# Patient Record
(demographics unavailable — no encounter records)

---

## 2025-01-06 NOTE — PHYSICAL EXAM
[Alert] : alert [Normocephalic] : normocephalic [Clear Tympanic membranes with present light reflex and bony landmarks] : clear tympanic membranes with present light reflex and bony landmarks [No Discharge] : no discharge [Palate Intact] : palate intact [No Caries] : no caries [Supple, full passive range of motion] : supple, full passive range of motion [Clear to Auscultation Bilaterally] : clear to auscultation bilaterally [Regular Rate and Rhythm] : regular rate and rhythm [Normal S1, S2 present] : normal S1, S2 present [No Murmurs] : no murmurs [Soft] : soft [Normoactive Bowel Sounds] : normoactive bowel sounds [Chris 1] : Chris 1 [Patent] : patent [No Abnormal Lymph Nodes Palpated] : no abnormal lymph nodes palpated [No Gait Asymmetry] : no gait asymmetry [No Spinal Dimple] : no spinal dimple [Cranial Nerves Grossly Intact] : cranial nerves grossly intact [No Rash or Lesions] : no rash or lesions [FreeTextEntry5] : GROSSLY NORMAL [FreeTextEntry3] : GROSSLYNORMAL [de-identified] : +OPEN BITE

## 2025-01-06 NOTE — DISCUSSION/SUMMARY
[Normal Growth] : growth [Normal Development] : development [No Elimination Concerns] : elimination [No Feeding Concerns] : feeding [No Skin Concerns] : skin [Normal Sleep Pattern] : sleep [Family Support] : family support [Encouraging Literacy Activities] : encouraging literacy activities [Playing with Peers] : playing with peers [Promoting Physical Activity] : promoting physical activity [Safety] : safety [No Medications] : ~He/She~ is not on any medications [Mother] : mother [FreeTextEntry4] : DISCUSSED WAYS TO DISCOURAGE THUMB SUCKING [de-identified] : TIMED TOILETING [FreeTextEntry1] : CBC AND LEAD SENT  FLU GIVEN [de-identified] : DENTAL [FreeTextEntry3] : YEARLY WELL [] : The components of the vaccine(s) to be administered today are listed in the plan of care. The disease(s) for which the vaccine(s) are intended to prevent and the risks have been discussed with the caretaker.  The risks are also included in the appropriate vaccination information statements which have been provided to the patient's caregiver.  The caregiver has given consent to vaccinate.

## 2025-01-06 NOTE — DEVELOPMENTAL MILESTONES
[Normal Development] : Normal Development [None] : none [Goes to the bathroom and urinates] : does not go to bathroom and urinates by self [Plays and shares with others] : plays and shares with others [Put on coat, jacket, or shirt by self] : puts on coat, jacket, or shirt by self [Begins to play make-believe] : begins to play make-believe [Eats independently] : eats independently [Uses 3-word sentences] : uses 3-word sentences [Uses words that are 75% intelligible] : uses words that are 75% intelligible to strangers [Understands simple prepositions] : understands simple prepositions [Pedals tricycle] : pedals tricycle [Climbs on and off couch] : climbs on and off couch or chair [Jumps forward] : jumps forward [Draws a single [place]] : draws a single [place] [Yes] : Completed. [FreeTextEntry1] : PASSED SCORE 18

## 2025-01-06 NOTE — HISTORY OF PRESENT ILLNESS
[Mother] : mother [Normal] : Normal [In bed] : In bed [Brushing teeth] : Brushing teeth [Toothpaste] : Primary Fluoride Source: Toothpaste [In nursery school] : In nursery school [Playtime (60 min/d)] : Playtime 60 min a day [No] : Not at  exposure [Car seat in back seat] : Car seat in back seat [Up to date] : Up to date [FreeTextEntry7] : LAST WELL AT 2.5 YEARS [de-identified] : SUCKS HER THUMB [de-identified] : PICKY EATER  CAN USE A CUP/ STRAW/UTENSILS [FreeTextEntry8] : REG BMS IN THE PROCESS OF TRAINING [FreeTextEntry3] : NAPS ONCE [de-identified] : SUCKS HER THUMB [FreeTextEntry9] : GENERATION 3K   SCOOTER/KICKS [NO] : No

## 2025-04-24 NOTE — HISTORY OF PRESENT ILLNESS
[FreeTextEntry6] : ESTABLISHED PT VOMITING AND DIARRHEA OVER THE WEEKEND NO FEVERS  NORMAL URINE TODAY WOKE WITH STOMACH PAIN, LOOKS DISTENDED  2 SOFT STOOLS THIS MORNING  NO RECENT TRAVEL NO SICK CONTACTS AT DAY CARE

## 2025-04-24 NOTE — DISCUSSION/SUMMARY
[FreeTextEntry1] : AGE NO DHN BLAND DIET, ELECTROLYTE SOLUTION ADVANCE AS TOLERATED DAILY PROBIOTIC UNTIL SYMPTOMS RESOLVE MONITOR FOR SIGNS OF DHN

## 2025-04-24 NOTE — PHYSICAL EXAM
[NL] : pink nasal mucosa [Supple] : supple [Clear to Auscultation Bilaterally] : clear to auscultation bilaterally [Regular Rate and Rhythm] : regular rate and rhythm [Normal S1, S2 audible] : normal S1, S2 audible [Soft] : soft [Normal Bowel Sounds] : normal bowel sounds [Tenderness with Palpation] : tenderness with palpation [Murmur] : no murmur [Mass] : no mass palpable [Warm, Well Perfused x4] : warm, well perfused x4 [Capillary Refill <2s] : capillary refill < 2s [Clear] : clear [FreeTextEntry5] : +TEARS [de-identified] : MUCOSA MOIST [FreeTextEntry9] : MILD TENDERNESS TO DEEP PALPATION, NO REBOUND ABLE TO JUMP